# Patient Record
Sex: MALE | URBAN - METROPOLITAN AREA
[De-identification: names, ages, dates, MRNs, and addresses within clinical notes are randomized per-mention and may not be internally consistent; named-entity substitution may affect disease eponyms.]

---

## 2023-03-17 ENCOUNTER — TELEPHONE (OUTPATIENT)
Dept: NURSING | Facility: CLINIC | Age: 48
End: 2023-03-17

## 2023-03-17 NOTE — TELEPHONE ENCOUNTER
"Pt calls to request status of \"DOT papers.\"  States \"Dropped off papers for Dr Smith to complete.\"  Now warm-transferring pt to a  for pt to speak with an on-site staffer re this issue.  Gisela MCKENZIE Health Nurse Advisor   "